# Patient Record
(demographics unavailable — no encounter records)

---

## 2021-08-24 NOTE — EMERGENCY DEPARTMENT REPORT
ED Shortness of Breath HPI





- General


Chief Complaint: Chest Pain


Stated Complaint: TIGHTENING IN CHEST


Source: patient


Mode of arrival: Ambulatory


Limitations: No Limitations





- History of Present Illness


Initial Comments: 





Chief complaint: "Shortness of breath, chest tight for the last 2 nights, I 

think I need some prednisone."





HPI: This is a 58-year-old female with history of cardiomyopathy, combined 

diastolic/systolic heart failure, hypertension, hyperlipidemia, COPD, emphysema,

tobacco dependence, alcohol dependence who presents with chest tightness and 

shortness of breath at night.  She has had productive cough and wheezing.  She 

uses albuterol nebulizer therapy.  She no longer has access his medication.  MDI

is not providing any relief.  She denies fever, sore throat, headache, nausea, 

new leg swelling.





Mrs. Saucedo was recently admitted to this hospital last month.  She had 

extensive work-up including CT angio chest, cardiology consultation 

echocardiogram





According to EMR echocardiogram performed July 8, 2021, ejection fraction 30 to 

35% with severe diastolic dysfunction


MD Complaint: shortness of breath, cough, chest pain


-: Gradual, During the night, Last night (During the last 2 nights)


Severity: moderate


Consistency: now resolved


Improves With: upright position


Known History Of: COPD, congestive heart failure


Context: recent illness


Associated Symptoms: denies other symptoms





- Related Data


                                  Previous Rx's











 Medication  Instructions  Recorded  Last Taken  Type


 


ALBUTEROL NEB's [Proventil 0.083% 2.5 mg IH Q4HRT PRN #30 nebu 07/09/21 Unknown 

Rx





NEBS]    


 


Albuterol Mdi (or & Nicu Only) 2 puff IH QID PRN #8.5 gram 07/09/21 Unknown Rx





[ProAir HFA Inhaler]    


 


Aspirin EC [Halfprin EC] 81 mg PO QDAY #30 tablet. 07/09/21 Unknown Rx


 


Folic Acid [Folvite] 1 mg PO QDAY #30 tablet 07/09/21 Unknown Rx


 


Pravastatin Sodium [Pravastatin] 10 mg PO QHS #30 tablet 07/09/21 Unknown Rx


 


carvediloL [Coreg] 6.25 mg PO BID #60 tablet 07/09/21 Unknown Rx


 


levoFLOXacin [Levaquin TAB] 750 mg PO Q24HR #5 tablet 07/09/21 Unknown Rx


 


lisinopriL [Zestril TAB] 2.5 mg PO QDAY #30 tablet 07/09/21 Unknown Rx


 


ALBUTEROL NEB's [Proventil 0.083% 2.5 mg IH TID PRN #1 box 08/24/21 Unknown Rx





NEBS]    


 


DOXYCYCLINE Hyclate [Vibramycin 100 mg PO Q12HR 7 Days #14 capsule 08/24/21 

Unknown Rx





CAP]    


 


Prednisone [predniSONE 10 mg 10 mg PO .TAPER #1 tab.ds.pk 08/24/21 Unknown Rx





(6-Day Pack, 21 Tabs)]    











                                    Allergies











Allergy/AdvReac Type Severity Reaction Status Date / Time


 


No Known Allergies Allergy   Verified 08/24/21 15:31














ED Review of Systems


ROS: 


Stated complaint: TIGHTENING IN CHEST


Other details as noted in HPI





Comment: All other systems reviewed and negative


Constitutional: denies: fever, malaise


Respiratory: cough, shortness of breath, wheezing


Cardiovascular: chest pain


Gastrointestinal: denies: abdominal pain, nausea, vomiting





ED Past Medical Hx





- Past Medical History


Previous Medical History?: Yes


Hx Congestive Heart Failure: Yes


Hx Asthma: No


Hx COPD: Yes


Additional medical history: RAPID HEART BEAT/ EMPHYSEMA





- Surgical History


Past Surgical History?: Yes


Additional Surgical History: RIGHT BREST/ C SECTION





- Family History


Family history: hypertension





- Social History


Smoking Status: Current Some Day Smoker


Substance Use Type: Alcohol





- Medications


Home Medications: 


                                Home Medications











 Medication  Instructions  Recorded  Confirmed  Last Taken  Type


 


ALBUTEROL NEB's [Proventil 0.083% 2.5 mg IH Q4HRT PRN #30 nebu 07/09/21  Unknown

 Rx





NEBS]     


 


Albuterol Mdi (or & Nicu Only) 2 puff IH QID PRN #8.5 gram 07/09/21  Unknown Rx





[ProAir HFA Inhaler]     


 


Aspirin EC [Halfprin EC] 81 mg PO QDAY #30 tablet. 07/09/21  Unknown Rx


 


Folic Acid [Folvite] 1 mg PO QDAY #30 tablet 07/09/21  Unknown Rx


 


Pravastatin Sodium [Pravastatin] 10 mg PO QHS #30 tablet 07/09/21  Unknown Rx


 


carvediloL [Coreg] 6.25 mg PO BID #60 tablet 07/09/21  Unknown Rx


 


levoFLOXacin [Levaquin TAB] 750 mg PO Q24HR #5 tablet 07/09/21  Unknown Rx


 


lisinopriL [Zestril TAB] 2.5 mg PO QDAY #30 tablet 07/09/21  Unknown Rx


 


ALBUTEROL NEB's [Proventil 0.083% 2.5 mg IH TID PRN #1 box 08/24/21  Unknown Rx





NEBS]     


 


DOXYCYCLINE Hyclate [Vibramycin 100 mg PO Q12HR 7 Days #14 capsule 08/24/21  

Unknown Rx





CAP]     


 


Prednisone [predniSONE 10 mg 10 mg PO .TAPER #1 tab.ds.pk 08/24/21  Unknown Rx





(6-Day Pack, 21 Tabs)]     














ED Physical Exam





- General


Limitations: No Limitations


General appearance: alert, in no apparent distress, other (Steady normal gait, 

appears comfortable, talkative, speaks full word sentences)





- Head


Head exam: Present: atraumatic, normocephalic





- Eye


Eye exam: Present: normal appearance





- ENT


ENT exam: Present: mucous membranes moist





- Neck


Neck exam: Present: normal inspection, full ROM





- Respiratory


Respiratory exam: Present: wheezes, other (Expiratory wheezing).  Absent: 

respiratory distress, rales, rhonchi, accessory muscle use, decreased breath 

sounds





- Cardiovascular


Cardiovascular Exam: Present: regular rate, normal rhythm, normal heart sounds. 

Absent: systolic murmur, diastolic murmur, rubs, gallop





- GI/Abdominal


GI/Abdominal exam: Present: soft, normal bowel sounds.  Absent: distended, 

tenderness, guarding, rebound





- Extremities Exam


Extremities exam: Present: normal inspection





- Neurological Exam


Neurological exam: Present: alert, oriented X3





- Psychiatric


Psychiatric exam: Present: normal affect, normal mood





- Skin


Skin exam: Present: warm, dry, intact, normal color.  Absent: rash





ED Course


                                   Vital Signs











  08/24/21





  15:34


 


Temperature 97.9 F


 


Pulse Rate 67


 


Respiratory 20





Rate 


 


Blood Pressure 135/101


 


O2 Sat by Pulse 98





Oximetry 














ED Medical Decision Making





- Lab Data


Result diagrams: 


                                 08/24/21 15:56





                                 08/24/21 15:56








                         Laboratory Results - last 24 hr











  08/24/21 08/24/21 08/24/21





  15:56 15:56 18:32


 


WBC  5.8  


 


RBC  3.95  


 


Hgb  12.3  


 


Hct  37.7  


 


MCV  95  


 


MCH  31  


 


MCHC  33  


 


RDW  13.7  


 


Plt Count  189  


 


Lymph % (Auto)  30.9  


 


Mono % (Auto)  8.5 H  


 


Eos % (Auto)  3.5  


 


Baso % (Auto)  0.7  


 


Lymph # (Auto)  1.8  


 


Mono # (Auto)  0.5  


 


Eos # (Auto)  0.2  


 


Baso # (Auto)  0.0  


 


Seg Neutrophils %  56.4  


 


Seg Neutrophils #  3.3  


 


Sodium   139 


 


Potassium   3.9 


 


Chloride   104.5 


 


Carbon Dioxide   24 


 


Anion Gap   14 


 


BUN   11 


 


Creatinine   0.6 


 


Estimated GFR   > 60 


 


BUN/Creatinine Ratio   18 


 


Glucose   93 


 


Calcium   9.0 


 


Total Bilirubin   0.70 


 


AST   30 


 


ALT   24 


 


Alkaline Phosphatase   78 


 


Troponin T   < 0.010  < 0.010


 


NT-Pro-B Natriuret Pep   2951 H 


 


Total Protein   6.7 


 


Albumin   4.0 


 


Albumin/Globulin Ratio   1.5 














  08/24/21





  21:28


 


WBC 


 


RBC 


 


Hgb 


 


Hct 


 


MCV 


 


MCH 


 


MCHC 


 


RDW 


 


Plt Count 


 


Lymph % (Auto) 


 


Mono % (Auto) 


 


Eos % (Auto) 


 


Baso % (Auto) 


 


Lymph # (Auto) 


 


Mono # (Auto) 


 


Eos # (Auto) 


 


Baso # (Auto) 


 


Seg Neutrophils % 


 


Seg Neutrophils # 


 


Sodium 


 


Potassium 


 


Chloride 


 


Carbon Dioxide 


 


Anion Gap 


 


BUN 


 


Creatinine 


 


Estimated GFR 


 


BUN/Creatinine Ratio 


 


Glucose 


 


Calcium 


 


Total Bilirubin 


 


AST 


 


ALT 


 


Alkaline Phosphatase 


 


Troponin T  < 0.010


 


NT-Pro-B Natriuret Pep 


 


Total Protein 


 


Albumin 


 


Albumin/Globulin Ratio 














- EKG Data


-: EKG Interpreted by Me


EKG shows normal: sinus rhythm, axis


Rate: normal





- EKG Data


Interpretation: nonspecific ST-T wave joe





08/24/21 22:21


EKG obtained 1539


EKG interpreted by me


Rate 60 bpm normal axis normal intervals no ST elevation nonspecific T wave 

pattern diffuse T wave inversion LVH with repolarization abnormality





- Radiology Data


Radiology results: report reviewed





Patient Name: SHERIF HERNANDEZ


Patient ID: [id number]


Gender: Female


YOB: 1963


Home Phone: [phone]


Referring Provider: RAJESH, ED


Organization: [facility]


Accession Number: [id number]


Requested Date: August 24, 2021 15:41


Report Status: Final


Requested Procedure: 1


Procedure Description: XR chest routine 2V


Modality: XR


Findings


Reporting MD: Jorge Marcos


Dictation Time: August 24, 2021 15:27


: Not available


Transcription Date:


CHEST 2 VIEWS 


INDICATION / CLINICAL INFORMATION: CP WITH SOB. 


COMPARISON: 7/8/2021. 


FINDINGS: 


SUPPORT DEVICES: None. 


HEART / MEDIASTINUM: No significant abnormality. 


LUNGS / PLEURA: Chronic appearing interstitial opacities are somewhat less 

prominent on today's examination compared with


reference. No focal consolidation. No pneumothorax. 


ADDITIONAL FINDINGS: No significant additional findings. 


IMPRESSION: 


Chronic interstitial opacities bilaterally, less prominent compared to reference

exam, without acute cardiopulmonary abnormality


identified. 


Signer Name: Jorge Marcos MD 


Signed: 8/24/2021 3:27 PM 


Workstation Name: [email or web address]





- Medical Decision Making





COPD exacerbation: Patient has normal oxygen saturation without respiratory 

distress.  I have prescribed doxycycline, albuterol nebulizer solution, 

prednisone taper.  No indication of pneumonia or pulmonary edema.  Chest 

radiograph slightly improved from July 2021.  Patient has chronic scarring Seen 

on radiographs.





No indication of acute coronary syndrome, pulmonary embolism or acute heart 

failure.  Patient given referral to internal medicine physician.  Also marita kaplan to follow-up with cardiologist.  Strongly recommended smoking cessation.  


Critical care attestation.: 


If time is entered above; I have spent that time in minutes in the direct care 

of this critically ill patient, excluding procedure time.








ED Disposition


Clinical Impression: 


 COPD exacerbation





Disposition: 01 HOME / SELF CARE / HOMELESS


Is pt being admited?: No


Does the pt Need Aspirin: No


Condition: Stable


Instructions:  Steps to Quit Smoking, Easy-to-Read, Chronic Obstructive 

Pulmonary Disease, Easy-to-Read, Chronic Obstructive Pulmonary Disease (ED)


Prescriptions: 


Prednisone [predniSONE 10 mg (6-Day Pack, 21 Tabs)] 10 mg PO .TAPER #1 tab.ds.pk


ALBUTEROL NEB's [Proventil 0.083% NEBS] 2.5 mg IH TID PRN #1 box


 PRN Reason: Wheezing


DOXYCYCLINE Hyclate [Vibramycin CAP] 100 mg PO Q12HR 7 Days #14 capsule


Referrals: 


CEZAR COX MD [Staff Physician] - 3-5 Days


EMILY THACKER MD [Staff Physician] - 3-5 Days

## 2021-08-24 NOTE — XRAY REPORT
CHEST 2 VIEWS 



INDICATION / CLINICAL INFORMATION: CP WITH SOB.



COMPARISON: 7/8/2021.



FINDINGS:



SUPPORT DEVICES: None.

HEART / MEDIASTINUM: No significant abnormality. 

LUNGS / PLEURA: Chronic appearing interstitial opacities are somewhat less prominent on today's exami
nation compared with reference. No focal consolidation. No pneumothorax. 



ADDITIONAL FINDINGS: No significant additional findings.



IMPRESSION:

Chronic interstitial opacities bilaterally, less prominent compared to reference exam, without acute 
cardiopulmonary abnormality identified.



Signer Name: Jorge Marcos MD 

Signed: 8/24/2021 4:27 PM

Workstation Name: [employer]

## 2021-08-24 NOTE — EVENT NOTE
ED Screening Note


Date of service: 08/24/21


Time: 17:06


ED Screening Note: 





Pt complains of chest tightness and SOB x last night


hx of COPD and CHF


admitted 7/8/21 for similar





This initial assessment/diagnostic orders/clinical plan/treatment(s) is/are 

subject to change based on patients health status, clinical progression and re-

assessment by fellow clinical providers in the ED. Further treatment and workup 

at subsequent clinical providers discretion. Patient/guardian urged not to elope

from the ED as their condition may be serious if not clinically assessed and 

managed. 





Initial orders include: 


labs


CXR


ekg

## 2021-08-25 NOTE — ELECTROCARDIOGRAPH REPORT
[facility]

                                       

Test Date:    2021               Test Time:    15:39:12

Pat Name:     SHERIF HERNANDEZ             Department:   

Patient ID:   [id number]          Room:          

Gender:       F                        Technician:   ROSHNI

:          1963               Requested By: TOMMY MCCLELLAN

Order Number: [id number]              Reading MD:   Shankar Villagran

                                 Measurements

Intervals                              Axis          

Rate:         62                       P:            -9

WI:           180                      QRS:          44

QRSD:         104                      T:            -85

QT:           448                                    

QTc:          454                                    

                           Interpretive Statements

Sinus rhythm

Probable LVH with secondary repol abnrm

Compared to ECG 2021 07:52:11

Sinus arrhythmia no longer present

Atrial abnormality no longer present

ST (T wave) deviation no longer present

Electronically Signed On 2021 11:27:03 EDT by Shankar Villagran

## 2021-10-20 NOTE — EMERGENCY DEPARTMENT REPORT
ED Back Pain/Injury HPI





- General


Chief Complaint: Back Pain/Injury


Stated Complaint: BACK PAIN


Time Seen by Provider: 10/20/21 18:17


Source: patient


Limitations: No Limitations





- History of Present Illness


Initial Comments: 





Patient is a 58-year-old female presents emergency room with complaints of 

exacerbation of her chronic back pain.  She states over the last few days she ha

s had some discomfort in her back.  She states that she believes it is due to 

the change in weather.  She states that she has chronic back pain from a slip 

and fall several years ago.  She denies any previous surgeries on her back.  She

denies any acute fall or injury or trauma.  She denies any fever, nausea, 

vomiting, diarrhea, urinary symptoms, numbness, weakness, bowel or bladder 

incontinence, chest pain, shortness of breath.  Past medical history of CHF, 

tachycardia, COPD.  No allergies to medications.  She states she is currently 

seeing her primary care doctor for this chronic back pain and they ordered for 

outpatient x-rays.  She is not currently seeing a spine specialist. she denies 

any hx of renal disease. 





- Related Data


                                  Previous Rx's











 Medication  Instructions  Recorded  Last Taken  Type


 


ALBUTEROL NEB's [Proventil 0.083% 2.5 mg IH Q4HRT PRN #30 nebu 07/09/21 Unknown 

Rx





NEBS]    


 


Albuterol Mdi (or & Nicu Only) 2 puff IH QID PRN #8.5 gram 07/09/21 Unknown Rx





[ProAir HFA Inhaler]    


 


Aspirin EC [Halfprin EC] 81 mg PO QDAY #30 tablet. 07/09/21 Unknown Rx


 


Folic Acid [Folvite] 1 mg PO QDAY #30 tablet 07/09/21 Unknown Rx


 


Pravastatin Sodium [Pravastatin] 10 mg PO QHS #30 tablet 07/09/21 Unknown Rx


 


carvediloL [Coreg] 6.25 mg PO BID #60 tablet 07/09/21 Unknown Rx


 


levoFLOXacin [Levaquin TAB] 750 mg PO Q24HR #5 tablet 07/09/21 Unknown Rx


 


lisinopriL [Zestril TAB] 2.5 mg PO QDAY #30 tablet 07/09/21 Unknown Rx


 


ALBUTEROL NEB's [Proventil 0.083% 2.5 mg IH TID PRN #1 box 08/24/21 Unknown Rx





NEBS]    


 


DOXYCYCLINE Hyclate [Vibramycin 100 mg PO Q12HR 7 Days #14 capsule 08/24/21 

Unknown Rx





CAP]    


 


Prednisone [predniSONE 10 mg 10 mg PO .TAPER #1 tab.ds.pk 08/24/21 Unknown Rx





(6-Day Pack, 21 Tabs)]    


 


Meloxicam [Mobic] 7.5 mg PO QDAY #10 tablet 10/20/21 Unknown Rx


 


Menthol/Camphor [Tiger Balm 1 applicatio TP BID #18 oint...g. 10/20/21 Unknown 

Rx





Ointment]    


 


methOCARBAMOL [Robaxin TAB] 500 mg PO BID PRN 7 Days #14 tab 10/20/21 Unknown Rx











                                    Allergies











Allergy/AdvReac Type Severity Reaction Status Date / Time


 


No Known Allergies Allergy   Verified 08/24/21 15:31














ED Review of Systems


ROS: 


Stated complaint: BACK PAIN


Other details as noted in HPI





Comment: All other systems reviewed and negative





ED Past Medical Hx





- Past Medical History


Hx Congestive Heart Failure: Yes


Hx Asthma: No


Hx COPD: Yes


Additional medical history: RAPID HEART BEAT/ EMPHYSEMA





- Surgical History


Additional Surgical History: RIGHT BREST/ C SECTION





- Social History


Smoking Status: Light Tobacco Smoker


Substance Use Type: Alcohol





- Medications


Home Medications: 


                                Home Medications











 Medication  Instructions  Recorded  Confirmed  Last Taken  Type


 


ALBUTEROL NEB's [Proventil 0.083% 2.5 mg IH Q4HRT PRN #30 nebu 07/09/21  Unknown

 Rx





NEBS]     


 


Albuterol Mdi (or & Nicu Only) 2 puff IH QID PRN #8.5 gram 07/09/21  Unknown Rx





[ProAir HFA Inhaler]     


 


Aspirin EC [Halfprin EC] 81 mg PO QDAY #30 tablet. 07/09/21  Unknown Rx


 


Folic Acid [Folvite] 1 mg PO QDAY #30 tablet 07/09/21  Unknown Rx


 


Pravastatin Sodium [Pravastatin] 10 mg PO QHS #30 tablet 07/09/21  Unknown Rx


 


carvediloL [Coreg] 6.25 mg PO BID #60 tablet 07/09/21  Unknown Rx


 


levoFLOXacin [Levaquin TAB] 750 mg PO Q24HR #5 tablet 07/09/21  Unknown Rx


 


lisinopriL [Zestril TAB] 2.5 mg PO QDAY #30 tablet 07/09/21  Unknown Rx


 


ALBUTEROL NEB's [Proventil 0.083% 2.5 mg IH TID PRN #1 box 08/24/21  Unknown Rx





NEBS]     


 


DOXYCYCLINE Hyclate [Vibramycin 100 mg PO Q12HR 7 Days #14 capsule 08/24/21  

Unknown Rx





CAP]     


 


Prednisone [predniSONE 10 mg 10 mg PO .TAPER #1 tab.ds.pk 08/24/21  Unknown Rx





(6-Day Pack, 21 Tabs)]     


 


Meloxicam [Mobic] 7.5 mg PO QDAY #10 tablet 10/20/21  Unknown Rx


 


Menthol/Camphor [Tiger Balm 1 applicatio TP BID #18 oint...g. 10/20/21  Unknown 

Rx





Ointment]     


 


methOCARBAMOL [Robaxin TAB] 500 mg PO BID PRN 7 Days #14 tab 10/20/21  Unknown 

Rx














ED Physical Exam





- General


Limitations: No Limitations


General appearance: alert, in no apparent distress





- Head


Head exam: Present: atraumatic, normocephalic





- Eye


Eye exam: Present: normal appearance





- ENT


ENT exam: Present: mucous membranes moist





- Neck


Neck exam: Present: normal inspection, full ROM.  Absent: tenderness, 

meningismus





- Respiratory


Respiratory exam: Present: normal lung sounds bilaterally.  Absent: respiratory 

distress, wheezes, rales, rhonchi, stridor, chest wall tenderness, accessory 

muscle use, decreased breath sounds, prolonged expiratory





- Cardiovascular


Cardiovascular Exam: Present: regular rate, normal rhythm, normal heart sounds. 

Absent: systolic murmur, diastolic murmur, rubs, gallop





- Back Exam


Back exam: Present: normal inspection, full ROM, paraspinal tenderness 

(bilateral lumbar paraspinal ttp, no midline C-spine, T-spine or L-spine ttp, no

step offs, no deformity, no edema, no skin changes ).  Absent: vertebral 

tenderness





- Neurological Exam


Neurological exam: Present: alert, oriented X3





- Psychiatric


Psychiatric exam: Present: normal affect, normal mood





- Skin


Skin exam: Present: warm, dry, intact





ED Course


                                   Vital Signs











  10/20/21 10/20/21





  18:13 18:57


 


Temperature 98.1 F 98.9 F


 


Pulse Rate 88 99 H


 


Respiratory 18 18





Rate  


 


Blood Pressure 160/84 151/83


 


O2 Sat by Pulse 98 94





Oximetry  














ED Medical Decision Making





- Medical Decision Making





Patient is a 58-year-old female presents emergency room with complaints of 

exacerbation of her chronic back pain.  She states over the last few days she 

has had some discomfort in her back.  She states that she believes it is due to 

the change in weather.  She states that she has chronic back pain from a slip 

and fall several years ago.  She denies any previous surgeries on her back.  She

denies any acute fall or injury or trauma.  She denies any fever, nausea, 

vomiting, diarrhea, urinary symptoms, numbness, weakness, bowel or bladder 

incontinence, chest pain, shortness of breath.  Past medical history of CHF, 

tachycardia, COPD.  No allergies to medications.  She states she is currently s

eeing her primary care doctor for this chronic back pain and they ordered for 

outpatient x-rays.  She is not currently seeing a spine specialist. she denies 

any hx of renal disease.  Vitals are stable.  On exam:bilateral lumbar 

paraspinal ttp, no midline C-spine, T-spine or L-spine ttp, no step offs, no 

deformity, no edema, no skin changes, no focal neuro deficit, ambulatory without

difficulty.  Patient does not have any red flag warning signs of back pain, no 

trauma, no unexplained weight loss, no fever, no IV drug use, no history of 

cancer, no neuro deficits. she has not had any changes in her chronic back pain.

Patient given Toradol IM on the emergency department with improvement of her 

symptoms. discussed the importance of primary care and neurosurgery follow-up.  

Advised patient Please use medication as prescribed.  Follow-up with a primary 

care doctor.  Follow-up with a spine specialist.  Return to emergency room for 

any new or worsening symptoms.


Critical care attestation.: 


If time is entered above; I have spent that time in minutes in the direct care 

of this critically ill patient, excluding procedure time.








ED Disposition


Clinical Impression: 


Back pain


Qualifiers:


 Back pain location: low back pain Chronicity: chronic Back pain laterality: 

bilateral Sciatica presence: without sciatica Qualified Code(s): M54.50 - Low 

back pain, unspecified





Disposition: 01 HOME / SELF CARE / HOMELESS


Is pt being admited?: No


Does the pt Need Aspirin: No


Condition: Stable


Instructions:  Chronic Back Pain, Easy-to-Read


Additional Instructions: 


Please use medication as prescribed.  Follow-up with a primary care doctor.  

Follow-up with a spine specialist.  Return to emergency room for any new or 

worsening symptoms.


Prescriptions: 


Meloxicam [Mobic] 7.5 mg PO QDAY #10 tablet


methOCARBAMOL [Robaxin TAB] 500 mg PO BID PRN 7 Days #14 tab


 PRN Reason: muscle spasm/pain


Menthol/Camphor [Tiger Balm Ointment] 1 applicatio TP BID #18 oint...g.


Referrals: 


your, primary care doctor [Other] - 2-3 Days


CALEB MIRZA II, MD [Staff Physician] - 2-3 Days


Time of Disposition: 18:33


Print Language: ENGLISH

## 2021-10-23 NOTE — EMERGENCY DEPARTMENT REPORT
ED Shortness of Breath HPI





- General


Chief Complaint: Dyspnea/Respdistress


Stated Complaint: MACY


Time Seen by Provider: 10/23/21 14:35


Source: patient


Mode of arrival: Stretcher


Limitations: No Limitations





- History of Present Illness


Initial Comments: 





The patient was evaluated in the emergency department for symptoms described in 

the history of present illness.  He/she was evaluated in the context of the 

global COVID-19 pandemic, which necessitated consideration that the patient 

might be at risk for infection with the virus that causes COVID-19.  I

nstitutional protocols and algorithms that pertain to the evaluation of patients

at risk for COVID-19 are in a state of rapid change based on information 

released by regulatory bodies including the CDC and federal and state 

organizations.  These policies and algorithms were followed during the patient's

care in the emergency department.  Please note that these policies, procedures 

and recommendations changed on a rapid basis.











58-year-old -American female who reports a past medical history of CHF, 

rapid heart rate, COPD asthma and emphysema presents to the emergency room 

complaining of shortness of breath and epigastric discomfort.  Patient states 

that she drinks 2 beers daily, has started back smoking her new ports.  She 

states that she was hospitalized in the remote past in states that they were 

told her she needed oxygen.  Patient states that her her insurance will only 

cover her nebulizer machine.  She states that she never got her oxygen.  Patient

is unvaccinated and has not been tested for Covid in over a year.  Patient 

denies any cough no fever no chills no sore throat no nasal congestion.  She 

does not have a primary care provider locally and was last followed by Dr. Luevano in [place].  Patient states that her symptoms improve after having 

oxygen while she was on EMS vehicle.


MD Complaint: shortness of breath


Onset/Timin


-: days(s)


Pain Scale: 0


Consistency: intermittent


Improves With: oxygen


Worsens With: nothing


Known History Of: COPD, asthma, congestive heart failure


Context: smoke/fume exposure (She has started smoking again)


Treatments Prior to Arrival: other (Albuterol and treatment)





- Related Data


Home Oxygen Therapy: No


                                  Previous Rx's











 Medication  Instructions  Recorded  Last Taken  Type


 


ALBUTEROL NEB's [Proventil 0.083% 2.5 mg IH Q4HRT PRN #30 nebu 21 Unknown 

Rx





NEBS]    


 


Albuterol Mdi (or & Nicu Only) 2 puff IH QID PRN #8.5 gram 21 Unknown Rx





[ProAir HFA Inhaler]    


 


Aspirin EC [Halfprin EC] 81 mg PO QDAY #30 tablet. 21 Unknown Rx


 


Folic Acid [Folvite] 1 mg PO QDAY #30 tablet 21 Unknown Rx


 


Pravastatin Sodium [Pravastatin] 10 mg PO QHS #30 tablet 21 Unknown Rx


 


carvediloL [Coreg] 6.25 mg PO BID #60 tablet 21 Unknown Rx


 


levoFLOXacin [Levaquin TAB] 750 mg PO Q24HR #5 tablet 21 Unknown Rx


 


lisinopriL [Zestril TAB] 2.5 mg PO QDAY #30 tablet 21 Unknown Rx


 


ALBUTEROL NEB's [Proventil 0.083% 2.5 mg IH TID PRN #1 box 21 Unknown Rx





NEBS]    


 


DOXYCYCLINE Hyclate [Vibramycin 100 mg PO Q12HR 7 Days #14 capsule 21 

Unknown Rx





CAP]    


 


Prednisone [predniSONE 10 mg 10 mg PO .TAPER #1 tab.ds.pk 21 Unknown Rx





(6-Day Pack, 21 Tabs)]    


 


Meloxicam [Mobic] 7.5 mg PO QDAY #10 tablet 10/20/21 Unknown Rx


 


Menthol/Camphor [Tiger Balm 1 applicatio TP BID #18 oint...g. 10/20/21 Unknown 

Rx





Ointment]    


 


methOCARBAMOL [Robaxin TAB] 500 mg PO BID PRN 7 Days #14 tab 10/20/21 Unknown Rx


 


Azithromycin [Zithromax Z-ELSY] 250 mg PO DAILY #6 tab 10/23/21 Unknown Rx











                                    Allergies











Allergy/AdvReac Type Severity Reaction Status Date / Time


 


No Known Allergies Allergy   Verified 10/23/21 13:14














ED Review of Systems


ROS: 


Stated complaint: MACY


Other details as noted in HPI








ED Past Medical Hx





- Past Medical History


Hx Congestive Heart Failure: Yes


Hx Asthma: No


Hx COPD: Yes


Additional medical history: RAPID HEART BEAT/ EMPHYSEMA





- Surgical History


Additional Surgical History: RIGHT BREST/ C SECTION





- Social History


Smoking Status: Light Tobacco Smoker


Substance Use Type: Alcohol





- Medications


Home Medications: 


                                Home Medications











 Medication  Instructions  Recorded  Confirmed  Last Taken  Type


 


ALBUTEROL NEB's [Proventil 0.083% 2.5 mg IH Q4HRT PRN #30 nebu 21  Unknown

 Rx





NEBS]     


 


Albuterol Mdi (or & Nicu Only) 2 puff IH QID PRN #8.5 gram 21  Unknown Rx





[ProAir HFA Inhaler]     


 


Aspirin EC [Halfprin EC] 81 mg PO QDAY #30 tablet.dr 21  Unknown Rx


 


Folic Acid [Folvite] 1 mg PO QDAY #30 tablet 21  Unknown Rx


 


Pravastatin Sodium [Pravastatin] 10 mg PO QHS #30 tablet 21  Unknown Rx


 


carvediloL [Coreg] 6.25 mg PO BID #60 tablet 21  Unknown Rx


 


levoFLOXacin [Levaquin TAB] 750 mg PO Q24HR #5 tablet 21  Unknown Rx


 


lisinopriL [Zestril TAB] 2.5 mg PO QDAY #30 tablet 21  Unknown Rx


 


ALBUTEROL NEB's [Proventil 0.083% 2.5 mg IH TID PRN #1 box 21  Unknown Rx





NEBS]     


 


DOXYCYCLINE Hyclate [Vibramycin 100 mg PO Q12HR 7 Days #14 capsule 21  

Unknown Rx





CAP]     


 


Prednisone [predniSONE 10 mg 10 mg PO .TAPER #1 tab.ds.pk 21  Unknown Rx





(6-Day Pack, 21 Tabs)]     


 


Meloxicam [Mobic] 7.5 mg PO QDAY #10 tablet 10/20/21  Unknown Rx


 


Menthol/Camphor [Tiger Balm 1 applicatio TP BID #18 oint...g. 10/20/21  Unknown 

Rx





Ointment]     


 


methOCARBAMOL [Robaxin TAB] 500 mg PO BID PRN 7 Days #14 tab 10/20/21  Unknown 

Rx


 


Azithromycin [Zithromax Z-ELSY] 250 mg PO DAILY #6 tab 10/23/21  Unknown Rx














ED Physical Exam





- General


Limitations: No Limitations


General appearance: alert, in no apparent distress





- Head


Head exam: Present: atraumatic, normocephalic





- Eye


Eye exam: Present: normal appearance





- ENT


ENT exam: Present: mucous membranes moist





- Neck


Neck exam: Present: normal inspection





- Respiratory


Respiratory exam: Present: normal lung sounds bilaterally.  Absent: respiratory 

distress





- Cardiovascular


Cardiovascular Exam: Present: regular rate, normal rhythm.  Absent: systolic 

murmur, diastolic murmur, rubs, gallop





- GI/Abdominal


GI/Abdominal exam: Present: soft, normal bowel sounds





- Extremities Exam


Extremities exam: Present: normal inspection





- Back Exam


Back exam: Present: normal inspection





- Neurological Exam


Neurological exam: Present: alert, oriented X3





- Psychiatric


Psychiatric exam: Present: normal affect, normal mood





- Skin


Skin exam: Present: warm, dry, intact, normal color.  Absent: rash





ED Course


                                   Vital Signs











  10/23/21





  13:11


 


Temperature 99.3 F


 


Pulse Rate 82


 


Respiratory 18





Rate 


 


Blood Pressure 142/87





[Left] 


 


O2 Sat by Pulse 98





Oximetry 














ED Medical Decision Making





- Radiology Data


Radiology results: report reviewed








Study Comments


 


 [facility]  


                                     [address] [place] [address]  


 


                                            XRay Report   


                                               Signed  


 


Patient: SHERIF HERNANDEZ                                                           

     MR#: [id number]  


 


: 1963                                                                

Acct:[id number]      


 


Age/Sex: 58 / F                                                                

ADM Date: 10/23/21     


 


Loc: ED       


Attending Dr:   


 


 


Ordering Physician: LEONILA BHATIA  


Date of Service: 10/23/21  


Procedure(s): XR chest routine 2V  


Accession Number(s): [id number]  


 


cc: LEONILA BHATIA   


 


Fluoro Time In Minutes:   


 


CHEST 2 VIEWS   


 


 INDICATION / CLINICAL INFORMATION: sob  


 


 STUDY TIME:   


 


 COMPARISON: 2021  


 


 FINDINGS:  


 


 SUPPORT DEVICES: None.  


 


 HEART / MEDIASTINUM: Mild cardiomegaly is again seen   


 


 LUNGS / PLEURA: Prominent bilateral increased interstitial markings are again 

seen. I do not 


clearly see superimposed pneumonitis. Mild bibasilar atelectatic changes are 

seen. No pleural 


effusions are obvious. No pneumothorax.   


 


 ADDITIONAL FINDINGS: No significant additional findings.  


 


 


 Signer Name: Fabian Merritt MD   


 Signed: 10/23/2021 4:36 PM  


 Workstation Name: [id number]   


 


 


Transcribed By: FOREIGN  


Dictated By: Fabian Merritt MD  


Electronically Authenticated By: Fabian Merritt MD    


Signed Date/Time: 10/23/21 1636                                


 


 


 


DD/DT: 10/23/21 1635                                                            

  


TD/TT:





- Medical Decision Making





58-year-old -American female who reports a past medical history of CHF, 

rapid heart rate, COPD asthma and emphysema presents to the emergency room 

complaining of shortness of breath and epigastric discomfort.  Patient states 

that she drinks 2 beers daily, has started back smoking her new ports.  She 

states that she was hospitalized in the remote past in states that they were 

told her she needed oxygen.  Patient states that her her insurance will only 

cover her nebulizer machine.  She states that she never got her oxygen.  Patient

 is unvaccinated and has not been tested for Covid in over a year.  Patient 

denies any cough no fever no chills no sore throat no nasal congestion.  She 

does not have a primary care provider locally and was last followed by Dr. Luevano in [place].  Patient states that her symptoms improve after having 

oxygen while she was on EMS vehicle.














Chest x-ray ordered.  All vital signs within normal limits.  Patient states of 

symptoms have resolved since having oxygen.








X-ray is concerning for bibasilar atelectasis no concern for pneumonitis








I will place patient on azithromycin and have her follow-up with a community 

provider.


Critical care attestation.: 


If time is entered above; I have spent that time in minutes in the direct care 

of this critically ill patient, excluding procedure time.








ED Disposition


Clinical Impression: 


 Upper respiratory infection





Disposition: 01 HOME / SELF CARE / HOMELESS


Is pt being admited?: No


Does the pt Need Aspirin: No


Condition: Stable


Instructions:  Upper Respiratory Infection, Adult, Easy-to-Read


Additional Instructions: 


X-ray concern for bibasilar atelectasis.  Which I recommend completing an 

antibiotic and following up with the pulmonologist and a primary care provider.


Prescriptions: 


Azithromycin [Zithromax Z-ELSY] 250 mg PO DAILY #6 tab


Referrals: 


YULIET BROOKS MD [Primary Care Provider] - 3-5 Days


CEZAR COX MD [Staff Physician] - 3-5 Days


ISIDRA TEE MD [Staff Physician] - 3-5 Days


VANESSA WEBSTER MD [Staff Physician] - 3-5 Days


Time of Disposition: 17:09

## 2021-10-23 NOTE — XRAY REPORT
CHEST 2 VIEWS 



INDICATION / CLINICAL INFORMATION: sob



STUDY TIME: 1452



COMPARISON: 8/24/2021



FINDINGS:



SUPPORT DEVICES: None.



HEART / MEDIASTINUM: Mild cardiomegaly is again seen 



LUNGS / PLEURA: Prominent bilateral increased interstitial markings are again seen. I do not clearly 
see superimposed pneumonitis. Mild bibasilar atelectatic changes are seen. No pleural effusions are o
bvious. No pneumothorax. 



ADDITIONAL FINDINGS: No significant additional findings.





Signer Name: Fabian Merritt MD 

Signed: 10/23/2021 4:36 PM

Workstation Name: [id number]

## 2022-01-22 NOTE — EMERGENCY DEPARTMENT REPORT
ED CPR HPI





- General


Stated Complaint: CARDIAC ARREST


Time Seen by Provider: 22 10:35


Source: EMS, old records reviewed


Limitations: Other (cardiac arrest)





- History of Present Illness


Initial Comments: 





CC:  cardiac arrest





HPI:  This is a 58 yo female with hx of cardiomyopathy, HTN, hyperlipidemia, 

COPD, tobacco use who presents in cardiac arrest.  She was found unresponsive by

male .  She was last known well at 1 AM.  She was ventilated through 

Owen Airway by EMS.  Asystole rhythmon arrival persistent from 30 minutes of 

ACLS resuscitation attempts.  Patient received 3 doses of epinephrine via EMS.





According to EMR, Ms. Witt was prescribed a Life Vest 5 years ago.  She 

stopped wearing the Life Vest.


MD Complaint: found unresponsive


Place: home


Bystander CPR Performed: Yes (Male  performed chest compressions)


Initial Findings in the Field: unresponsive, other rhythm (asystole)


ROSC in the Field: No


Treatments Prior to Arrival: other airway device (Owen airway), epinephrine mgs 

# (3 doses of epinephrine)





- Related Data


                                  Previous Rx's











 Medication  Instructions  Recorded  Last Taken  Type


 


ALBUTEROL NEB's [Proventil 0.083% 2.5 mg IH Q4HRT PRN #30 nebu 21 Unknown 

Rx





NEBS]    


 


Albuterol Mdi (or & Nicu Only) 2 puff IH QID PRN #8.5 gram 21 Unknown Rx





[ProAir HFA Inhaler]    


 


Aspirin EC [Halfprin EC] 81 mg PO QDAY #30 tablet. 21 Unknown Rx


 


Folic Acid [Folvite] 1 mg PO QDAY #30 tablet 21 Unknown Rx


 


Pravastatin Sodium [Pravastatin] 10 mg PO QHS #30 tablet 21 Unknown Rx


 


carvediloL [Coreg] 6.25 mg PO BID #60 tablet 21 Unknown Rx


 


levoFLOXacin [Levaquin TAB] 750 mg PO Q24HR #5 tablet 21 Unknown Rx


 


lisinopriL [Zestril TAB] 2.5 mg PO QDAY #30 tablet 21 Unknown Rx


 


ALBUTEROL NEB's [Proventil 0.083% 2.5 mg IH TID PRN #1 box 21 Unknown Rx





NEBS]    


 


DOXYCYCLINE Hyclate [Vibramycin 100 mg PO Q12HR 7 Days #14 capsule 21 

Unknown Rx





CAP]    


 


Prednisone [predniSONE 10 mg 10 mg PO .TAPER #1 tab.ds.pk 21 Unknown Rx





(6-Day Pack, 21 Tabs)]    


 


Meloxicam [Mobic] 7.5 mg PO QDAY #10 tablet 10/20/21 Unknown Rx


 


Menthol/Camphor [Tiger Balm 1 applicatio TP BID #18 oint...g. 10/20/21 Unknown 

Rx





Ointment]    


 


methOCARBAMOL [Robaxin TAB] 500 mg PO BID PRN 7 Days #14 tab 10/20/21 Unknown Rx


 


Azithromycin [Zithromax Z-ELSY] 250 mg PO DAILY #6 tab 10/23/21 Unknown Rx











                                    Allergies











Allergy/AdvReac Type Severity Reaction Status Date / Time


 


No Known Allergies Allergy   Verified 10/23/21 13:14














ED Review of Systems


ROS: 


Stated complaint: CARDIAC ARREST


Other details as noted in HPI





Comment: Unobtainable due to pts medical conditions (Cardiac arrest)





ED Past Medical Hx





- Past Medical History


Previous Medical History?: Yes


Hx Congestive Heart Failure: Yes


Hx Asthma: No


Hx COPD: Yes


Additional medical history: RAPID HEART BEAT/ EMPHYSEMA





- Surgical History


Past Surgical History?: Yes


Additional Surgical History: RIGHT BREST/ C SECTION





- Social History


Smoking Status: Current Every Day Smoker


Substance Use Type: Alcohol





- Medications


Home Medications: 


                                Home Medications











 Medication  Instructions  Recorded  Confirmed  Last Taken  Type


 


ALBUTEROL NEB's [Proventil 0.083% 2.5 mg IH Q4HRT PRN #30 nebu 21  Unknown

 Rx





NEBS]     


 


Albuterol Mdi (or & Nicu Only) 2 puff IH QID PRN #8.5 gram 21  Unknown Rx





[ProAir HFA Inhaler]     


 


Aspirin EC [Halfprin EC] 81 mg PO QDAY #30 tablet. 21  Unknown Rx


 


Folic Acid [Folvite] 1 mg PO QDAY #30 tablet 21  Unknown Rx


 


Pravastatin Sodium [Pravastatin] 10 mg PO QHS #30 tablet 21  Unknown Rx


 


carvediloL [Coreg] 6.25 mg PO BID #60 tablet 21  Unknown Rx


 


levoFLOXacin [Levaquin TAB] 750 mg PO Q24HR #5 tablet 21  Unknown Rx


 


lisinopriL [Zestril TAB] 2.5 mg PO QDAY #30 tablet 21  Unknown Rx


 


ALBUTEROL NEB's [Proventil 0.083% 2.5 mg IH TID PRN #1 box 21  Unknown Rx





NEBS]     


 


DOXYCYCLINE Hyclate [Vibramycin 100 mg PO Q12HR 7 Days #14 capsule 21  

Unknown Rx





CAP]     


 


Prednisone [predniSONE 10 mg 10 mg PO .TAPER #1 tab.ds.pk 21  Unknown Rx





(6-Day Pack, 21 Tabs)]     


 


Meloxicam [Mobic] 7.5 mg PO QDAY #10 tablet 10/20/21  Unknown Rx


 


Menthol/Camphor [Tiger Balm 1 applicatio TP BID #18 oint...g. 10/20/21  Unknown 

Rx





Ointment]     


 


methOCARBAMOL [Robaxin TAB] 500 mg PO BID PRN 7 Days #14 tab 10/20/21  Unknown 

Rx


 


Azithromycin [Zithromax Z-ELSY] 250 mg PO DAILY #6 tab 10/23/21  Unknown Rx














ED Physical Exam





- General


Limitations: Other (Cardiac arrest)


General appearance: other (Lifeless no response to pain)





- Head


Head exam: Present: atraumatic, normocephalic





- Eye


Eye exam: Present: other (Fixed dilated pupils)





- ENT


ENT exam: Present: other (Mucosa Owen airway in place)





- Neck


Neck exam: Present: normal inspection, other (Positive JVD)





- Respiratory


Respiratory exam: Present: rales, rhonchi, other (Equal breath sounds with 

ventilation)





- Cardiovascular


Cardiovascular Exam: Present: other (No palpable pulse no auscultated cardiac 

sounds)





- GI/Abdominal


GI/Abdominal exam: Present: soft.  Absent: distended





- Extremities Exam


Extremities exam: Present: other (No traumatic deformity of the arms or legs no 

pedal edema)





- Neurological Exam


Neurological exam: Present: other (Lifeless no response to voice or pain)





- Psychiatric


Psychiatric exam: Present: other (Lifeless no response to voice or pain)





- Skin


Skin exam: Present: intact, other (Cool to touch)





ED Medical Decision Making





- Medical Decision Making





This is a 59-year-old female with history of cardiomyopathy, hypertension, 

hyperlipidemia, COPD who presents in cardiac arrest.  Patient received 30 

minutes of ACLS resuscitation attempt via EMS.  Asystole was confirmed on 3 

leads in the emergency department.  Time of death 1030





I performed the notification of family members in person.


Critical care attestation.: 


If time is entered above; I have spent that time in minutes in the direct care 

of this critically ill patient, excluding procedure time.








ED Disposition


Clinical Impression: 


 Death due to cardiac arrest





Disposition: 20 


Is pt being admited?: No


Does the pt Need Aspirin: No


Condition: Stable


Time of Disposition: 10:30